# Patient Record
Sex: MALE | Race: WHITE | Employment: UNEMPLOYED | URBAN - METROPOLITAN AREA
[De-identification: names, ages, dates, MRNs, and addresses within clinical notes are randomized per-mention and may not be internally consistent; named-entity substitution may affect disease eponyms.]

---

## 2018-10-18 ENCOUNTER — HOSPITAL ENCOUNTER (EMERGENCY)
Age: 38
Discharge: HOME | End: 2018-10-19
Attending: EMERGENCY MEDICINE
Payer: OTHER GOVERNMENT

## 2018-10-18 DIAGNOSIS — S09.90XA CLOSED HEAD INJURY, INITIAL ENCOUNTER: ICD-10-CM

## 2018-10-18 DIAGNOSIS — T14.8XXA ABRASION: ICD-10-CM
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
## 2018-10-18 DIAGNOSIS — F10.920 ALCOHOLIC INTOXICATION WITHOUT COMPLICATION (HCC): Primary | ICD-10-CM

## 2018-10-18 DIAGNOSIS — S01.511A LIP LACERATION, INITIAL ENCOUNTER: ICD-10-CM

## 2018-10-18 DIAGNOSIS — S02.2XXB OPEN FRACTURE OF NASAL BONE, INITIAL ENCOUNTER: ICD-10-CM

## 2018-10-18 PROCEDURE — 99283 EMERGENCY DEPT VISIT LOW MDM: CPT

## 2018-10-18 RX ORDER — OMEPRAZOLE 20 MG/1
20 CAPSULE, DELAYED RELEASE ORAL DAILY
COMMUNITY

## 2018-10-19 ENCOUNTER — APPOINTMENT (OUTPATIENT)
Dept: CT IMAGING | Age: 38
End: 2018-10-19
Attending: NURSE PRACTITIONER
Payer: OTHER GOVERNMENT

## 2018-10-19 VITALS
BODY MASS INDEX: 32.21 KG/M2 | DIASTOLIC BLOOD PRESSURE: 72 MMHG | HEART RATE: 81 BPM | HEIGHT: 70 IN | WEIGHT: 225 LBS | RESPIRATION RATE: 14 BRPM | OXYGEN SATURATION: 96 % | TEMPERATURE: 97.7 F | SYSTOLIC BLOOD PRESSURE: 124 MMHG

## 2018-10-19 PROCEDURE — 70486 CT MAXILLOFACIAL W/O DYE: CPT

## 2018-10-19 PROCEDURE — 70450 CT HEAD/BRAIN W/O DYE: CPT

## 2018-10-19 PROCEDURE — 77030012967 HC SPNG WND OPTPOR BMS -A

## 2018-10-19 PROCEDURE — 74011250637 HC RX REV CODE- 250/637: Performed by: NURSE PRACTITIONER

## 2018-10-19 PROCEDURE — 77030018836 HC SOL IRR NACL ICUM -A

## 2018-10-19 RX ORDER — IPRATROPIUM BROMIDE AND ALBUTEROL SULFATE 2.5; .5 MG/3ML; MG/3ML
3 SOLUTION RESPIRATORY (INHALATION)
Status: DISCONTINUED | OUTPATIENT
Start: 2018-10-19 | End: 2018-10-19

## 2018-10-19 RX ORDER — CLINDAMYCIN HYDROCHLORIDE 300 MG/1
300 CAPSULE ORAL 4 TIMES DAILY
Qty: 28 CAP | Refills: 0 | Status: SHIPPED | OUTPATIENT
Start: 2018-10-19 | End: 2018-10-26

## 2018-10-19 RX ORDER — CLINDAMYCIN HYDROCHLORIDE 150 MG/1
300 CAPSULE ORAL
Status: COMPLETED | OUTPATIENT
Start: 2018-10-19 | End: 2018-10-19

## 2018-10-19 RX ORDER — HYDROCODONE BITARTRATE AND ACETAMINOPHEN 5; 325 MG/1; MG/1
1 TABLET ORAL
Qty: 5 TAB | Refills: 0 | Status: SHIPPED | OUTPATIENT
Start: 2018-10-19

## 2018-10-19 RX ORDER — CLINDAMYCIN HYDROCHLORIDE 150 MG/1
CAPSULE ORAL
Status: DISCONTINUED
Start: 2018-10-19 | End: 2018-10-19 | Stop reason: HOSPADM

## 2018-10-19 RX ORDER — IBUPROFEN 600 MG/1
600 TABLET ORAL
Qty: 20 TAB | Refills: 0 | Status: SHIPPED | OUTPATIENT
Start: 2018-10-19

## 2018-10-19 RX ADMIN — CLINDAMYCIN HYDROCHLORIDE 300 MG: 150 CAPSULE ORAL at 03:42

## 2018-10-19 NOTE — ED TRIAGE NOTES
Pt ambulatory into ER after fall,  Pt w/ multiple[;e abrasions, left forearm, nose, chin, right forearm. Bleeding controlled.   Pt A+O

## 2018-10-19 NOTE — ED NOTES
Pt s/p fall while + ETOH at party. \"He tripped\" per friends at bedside. Pt has multiple abrasions- facial,bridge of nose, RT/LT FA. Pt denying any other injuries, No bleeding at the present. Slight RT facial bruising/and swelling. Ice pack being provided. NAD observed. \"I had a few drinks. \"

## 2018-10-19 NOTE — DISCHARGE INSTRUCTIONS
Take medications as prescribed, Norco for severe pain or difficulty sleeping otherwise take Tylenol or motrin, may apply ice to the area  Follow up as directed  Keep face, clean and dry  Return to the ED for increased pain, severe headache, visual changes, increased bleeding, fever, vomiting or worsening of symptoms         Cut in the Mouth: Care Instructions  Your Care Instructions  A cut in the mouth may be on your lips. It could also be inside your mouth. Many times, the cut is left open and stitches are not needed. But sometimes stitches help with healing or to stop bleeding. In some cases, the doctor will want to do some tests to check for other problems, like a tooth injury. These tests include imaging tests like an X-ray or a CT scan. If you have stitches, they will often dissolve on their own. But sometimes a doctor needs to take them out. Stitches are usually removed in about 5 days, but it may depend on the type of cut you have. The doctor has checked you carefully, but problems can develop later. If you notice any problems or new symptoms, get medical treatment right away. Follow-up care is a key part of your treatment and safety. Be sure to make and go to all appointments, and call your doctor if you are having problems. It's also a good idea to know your test results and keep a list of the medicines you take. How can you care for yourself at home? · If your doctor prescribed antibiotics, take them as directed. Do not stop taking them just because you feel better. You need to take the full course of antibiotics. · If you have pain, take an over-the-counter pain medicine, such as acetaminophen (Tylenol), ibuprofen (Advil, Motrin), or naproxen (Aleve). Be safe with medicines. Read and follow all instructions on the label. · It may help to cool the inside of your mouth with a piece of ice or a flavored ice pop.   · If the cut is inside your mouth:  ? Rinse your mouth with warm salt water right after meals. Saltwater rinses may help healing. To make a saltwater solution for rinsing the mouth, mix 1 tsp of salt in 1 cup of warm water. ? Eat soft foods that are easy to swallow. ? Avoid foods that might sting. These include salty or spicy foods, citrus fruits or juices, and tomatoes. ? Try using a topical medicine, such as Orabase, to reduce mouth pain. When should you call for help? Call 911 anytime you think you may need emergency care. For example, call if:    · You have trouble breathing.    Call your doctor now or seek immediate medical care if:    · You have problems swallowing.     · The cut starts to bleed. Oozing small amounts of blood is normal.     · You have symptoms of infection, such as:  ? Increased pain, swelling, warmth, or redness around the cut.  ? Red streaks leading from the cut.  ? Pus draining from the cut.  ? A fever.    Watch closely for changes in your health, and be sure to contact your doctor if:    · You notice a new problem like a tooth injury.     · You do not get better as expected. Where can you learn more? Go to http://clayton-luz.info/. Enter C587 in the search box to learn more about \"Cut in the Mouth: Care Instructions. \"  Current as of: November 20, 2017  Content Version: 11.8  © 3433-1599 [employer]. Care instructions adapted under license by [employer] (which disclaims liability or warranty for this information). If you have questions about a medical condition or this instruction, always ask your healthcare professional. [place] [address] any warranty or liability for your use of this information. Broken Nose: Care Instructions  Your Care Instructions    A broken nose is a break, or fracture, of the bone or cartilage. Most broken noses need only home care and a follow-up visit with a doctor. The swelling should go down in a few days.  Bruises around your eyes and nose should go away in 2 to 3 weeks.  You heal best when you take good care of yourself. Eat a variety of healthy foods, and don't smoke. Follow-up care is a key part of your treatment and safety. Be sure to make and go to all appointments, and call your doctor if you are having problems. It's also a good idea to know your test results and keep a list of the medicines you take. How can you care for yourself at home? · If you have a nasal splint or packing, leave it in place until a doctor removes it. · If your doctor prescribed antibiotics, take them as directed. Do not stop taking them just because you feel better. You need to take the full course of antibiotics. · Take decongestants as directed to help you breathe after the splint or packing is removed. Your doctor may give you a prescription or suggest over-the-counter medicine. · Be safe with medicines. Take pain medicines exactly as directed. ? If the doctor gave you a prescription medicine for pain, take it as prescribed. ? If you are not taking a prescription pain medicine, ask your doctor if you can take an over-the-counter medicine. · Put ice or a cold pack on your nose for 10 to 20 minutes at a time. Try to do this every 1 to 2 hours for the first 3 days (when you are awake) or until the swelling goes down. Put a thin cloth between the ice pack and your skin. · Sleep with your head slightly raised until the swelling goes down. Prop up your head and shoulders on pillows. · Do not play contact sports for 6 weeks. When should you call for help? Call 911 anytime you think you may need emergency care. For example, call if:    · You have trouble breathing.     · You passed out (lost consciousness).    Call your doctor now or seek immediate medical care if:    · You have signs of infection, such as:  ? Increased pain, swelling, warmth, or redness. ? Red streaks leading from the area. ? Pus draining from the area. ? A fever.     · You have clear fluid draining from your nose.   · You have vision changes.     · Your nose is bleeding.     · You have new or worse pain.    Watch closely for changes in your health, and be sure to contact your doctor if:    · You do not get better as expected. Where can you learn more? Go to http://clayton-luz.info/. Enter Y345 in the search box to learn more about \"Broken Nose: Care Instructions. \"  Current as of: November 29, 2017  Content Version: 11.8  © 9089-5911 [employer]. Care instructions adapted under license by [employer] (which disclaims liability or warranty for this information). If you have questions about a medical condition or this instruction, always ask your healthcare professional. [address] any warranty or liability for your use of this information. Acute Alcohol Intoxication: Care Instructions  Your Care Instructions    You have had treatment to help your body rid itself of alcohol. Too much alcohol upsets the body's fluid balance. Your doctor may have given you fluids and vitamins. For some people, drinking too much alcohol is a one-time event. For others, it is an ongoing problem. In either case, it is serious. It can be life-threatening. Follow-up care is a key part of your treatment and safety. Be sure to make and go to all appointments, and call your doctor if you are having problems. It's also a good idea to know your test results and keep a list of the medicines you take. How can you care for yourself at home? · Do not drink and drive. · Be safe with medicines. Take your medicines exactly as prescribed. Call your doctor if you think you are having a problem with your medicine. · Your doctor may have prescribed disulfiram (Antabuse). Do not drink any alcohol while you are taking this medicine. You may have severe or even life-threatening side effects from even small amounts of alcohol.   · If you were given medicine to prevent nausea, be sure to take it exactly as prescribed. · Before you take any medicine, tell your doctor if:  ? You have had a bad reaction to any medicines in the past.  ? You are taking other medicines, including over-the-counter ones, or have other health problems. ? You are or could be pregnant. · Be prepared to have some symptoms of withdrawal in the next few days. · Drink plenty of liquids in the next few days. · Seek help if you need it to stop drinking. Getting counseling and joining a support group can help you stay sober. Try a support group such as Alcoholics Anonymous. · Avoid alcohol when you take medicines. It can react with many medicines and cause serious problems. When should you call for help? Call 911 anytime you think you may need emergency care. For example, call if:    · You feel confused and are seeing things that are not there.     · You are thinking about killing yourself or hurting others.     · You have a seizure.     · You vomit blood or what looks like coffee grounds.    Call your doctor now or seek immediate medical care if:    · You have trembling, restlessness, sweating, and other withdrawal symptoms that are new or that get worse.     · Your withdrawal symptoms come back after not bothering you for days or weeks.     · You can't stop vomiting.    Watch closely for changes in your health, and be sure to contact your doctor if:    · You need help to stop drinking. Where can you learn more? Go to http://clayton-luz.info/. Enter T102 in the search box to learn more about \"Acute Alcohol Intoxication: Care Instructions. \"  Current as of: May 8, 2018  Content Version: 11.8  © 8738-7171 [employer]. Care instructions adapted under license by [employer] (which disclaims liability or warranty for this information).  If you have questions about a medical condition or this instruction, always ask your healthcare professional. [address] any warranty or liability for your use of this information.

## 2018-10-19 NOTE — ED NOTES
Clindamycin PO provided per orders- see MAR. Pt being d/c home with his Friends/Supervisor. D/C instructions/RXs reviewed with pt/understanding acknowledged by pt. Pt aware of getting his 1st dose of Clindamycin PO in the ED prior to d/c. Pt has steri strips to bridge of nose. Abrasions appear clean. Pt being wheeled via [facility] by this RN for d/c home/accompanied by friends- [place] transportation.